# Patient Record
Sex: FEMALE | Race: WHITE | Employment: STUDENT | URBAN - NONMETROPOLITAN AREA
[De-identification: names, ages, dates, MRNs, and addresses within clinical notes are randomized per-mention and may not be internally consistent; named-entity substitution may affect disease eponyms.]

---
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
## 2018-07-30 ENCOUNTER — OFFICE VISIT (OUTPATIENT)
Dept: PRIMARY CARE CLINIC | Age: 18
End: 2018-07-30
Payer: COMMERCIAL

## 2018-07-30 VITALS
OXYGEN SATURATION: 99 % | WEIGHT: 199 LBS | HEIGHT: 65 IN | DIASTOLIC BLOOD PRESSURE: 64 MMHG | HEART RATE: 68 BPM | RESPIRATION RATE: 16 BRPM | TEMPERATURE: 97.8 F | BODY MASS INDEX: 33.15 KG/M2 | SYSTOLIC BLOOD PRESSURE: 110 MMHG

## 2018-07-30 DIAGNOSIS — G43.019 INTRACTABLE MIGRAINE WITHOUT AURA AND WITHOUT STATUS MIGRAINOSUS: ICD-10-CM

## 2018-07-30 DIAGNOSIS — Z00.00 WELL ADULT ON ROUTINE HEALTH CHECK: Primary | ICD-10-CM

## 2018-07-30 DIAGNOSIS — Z23 NEED FOR HEPATITIS A VACCINATION: ICD-10-CM

## 2018-07-30 PROCEDURE — 99395 PREV VISIT EST AGE 18-39: CPT | Performed by: NURSE PRACTITIONER

## 2018-07-30 ASSESSMENT — PATIENT HEALTH QUESTIONNAIRE - PHQ9
1. LITTLE INTEREST OR PLEASURE IN DOING THINGS: 0
SUM OF ALL RESPONSES TO PHQ QUESTIONS 1-9: 0
2. FEELING DOWN, DEPRESSED OR HOPELESS: 0
SUM OF ALL RESPONSES TO PHQ9 QUESTIONS 1 & 2: 0

## 2018-07-30 ASSESSMENT — ENCOUNTER SYMPTOMS
RESPIRATORY NEGATIVE: 1
GASTROINTESTINAL NEGATIVE: 1
EYES NEGATIVE: 1

## 2018-07-31 PROCEDURE — 90471 IMMUNIZATION ADMIN: CPT | Performed by: NURSE PRACTITIONER

## 2018-07-31 PROCEDURE — 90632 HEPA VACCINE ADULT IM: CPT | Performed by: NURSE PRACTITIONER

## 2019-02-01 ENCOUNTER — NURSE ONLY (OUTPATIENT)
Dept: PRIMARY CARE CLINIC | Age: 19
End: 2019-02-01
Payer: COMMERCIAL

## 2019-02-01 DIAGNOSIS — Z23 NEED FOR HEPATITIS A VACCINATION: Primary | ICD-10-CM

## 2019-02-01 PROCEDURE — 90471 IMMUNIZATION ADMIN: CPT | Performed by: FAMILY MEDICINE

## 2019-02-01 PROCEDURE — 90632 HEPA VACCINE ADULT IM: CPT | Performed by: FAMILY MEDICINE

## 2020-05-21 ENCOUNTER — OFFICE VISIT (OUTPATIENT)
Dept: PRIMARY CARE CLINIC | Age: 20
End: 2020-05-21
Payer: COMMERCIAL

## 2020-05-21 VITALS
OXYGEN SATURATION: 98 % | DIASTOLIC BLOOD PRESSURE: 76 MMHG | RESPIRATION RATE: 18 BRPM | WEIGHT: 206.2 LBS | HEART RATE: 102 BPM | TEMPERATURE: 98.8 F | HEIGHT: 65 IN | SYSTOLIC BLOOD PRESSURE: 118 MMHG | BODY MASS INDEX: 34.35 KG/M2

## 2020-05-21 PROCEDURE — 99395 PREV VISIT EST AGE 18-39: CPT | Performed by: NURSE PRACTITIONER

## 2020-05-21 RX ORDER — NORTRIPTYLINE HYDROCHLORIDE 10 MG/1
CAPSULE ORAL
COMMUNITY
Start: 2020-01-08

## 2020-05-21 ASSESSMENT — PATIENT HEALTH QUESTIONNAIRE - PHQ9
2. FEELING DOWN, DEPRESSED OR HOPELESS: 0
1. LITTLE INTEREST OR PLEASURE IN DOING THINGS: 0
SUM OF ALL RESPONSES TO PHQ QUESTIONS 1-9: 0
SUM OF ALL RESPONSES TO PHQ9 QUESTIONS 1 & 2: 0
SUM OF ALL RESPONSES TO PHQ QUESTIONS 1-9: 0

## 2020-05-21 ASSESSMENT — ENCOUNTER SYMPTOMS
EYES NEGATIVE: 1
GASTROINTESTINAL NEGATIVE: 1
RESPIRATORY NEGATIVE: 1

## 2020-05-21 NOTE — PROGRESS NOTES
Outpatient Medications   Medication Sig Dispense Refill    nortriptyline (PAMELOR) 10 MG capsule 5 caps qhs for 14 days then 4 caps qhs for 14 days then 3 caps qhs for 14 days then 2 caps qhs for 14 days then 1 cap qhs for 14 days      promethazine (PHENERGAN) 25 MG tablet Take 25 mg by mouth every 6 hours as needed for Nausea      naproxen (NAPROSYN) 375 MG tablet        No current facility-administered medications for this visit. Allergies   Allergen Reactions    Penicillins Rash       Health Maintenance   Topic Date Due    HPV vaccine (1 - 2-dose series) 09/08/2009    Chlamydia screen  09/08/2014    Cervical cancer screen  09/08/2019    DTaP/Tdap/Td vaccine (7 - Td) 09/21/2019    HIV screen  07/11/2021 (Originally 9/8/2013)    Flu vaccine (Season Ended) 09/01/2020    Hepatitis B vaccine  Completed    Hib vaccine  Completed    Varicella vaccine  Completed    Meningococcal (ACWY) vaccine  Completed    Hepatitis A vaccine  Aged Out    Pneumococcal 0-64 years Vaccine  Aged Out       Subjective:      Review of Systems   Constitutional: Negative. HENT: Negative. Eyes: Negative. Respiratory: Negative. Cardiovascular: Negative. Gastrointestinal: Negative. Genitourinary: Negative. Musculoskeletal: Negative. Neurological: Negative. Hematological: Negative. Psychiatric/Behavioral: Negative. Objective:     Physical Exam  Vitals signs and nursing note reviewed. Constitutional:       Appearance: She is well-developed. HENT:      Head: Normocephalic. Right Ear: Tympanic membrane and external ear normal.      Left Ear: Tympanic membrane and external ear normal.      Nose: Nose normal.   Eyes:      Pupils: Pupils are equal, round, and reactive to light. Neck:      Musculoskeletal: Normal range of motion. Cardiovascular:      Rate and Rhythm: Normal rate and regular rhythm. Heart sounds: Normal heart sounds.    Pulmonary:      Effort: Pulmonary effort is

## 2021-06-16 ENCOUNTER — OFFICE VISIT (OUTPATIENT)
Dept: PRIMARY CARE CLINIC | Age: 21
End: 2021-06-16
Payer: COMMERCIAL

## 2021-06-16 VITALS
OXYGEN SATURATION: 98 % | RESPIRATION RATE: 16 BRPM | HEART RATE: 98 BPM | BODY MASS INDEX: 36.19 KG/M2 | SYSTOLIC BLOOD PRESSURE: 110 MMHG | DIASTOLIC BLOOD PRESSURE: 70 MMHG | WEIGHT: 212 LBS | HEIGHT: 64 IN | TEMPERATURE: 97.6 F

## 2021-06-16 DIAGNOSIS — R21 RASH AND NONSPECIFIC SKIN ERUPTION: ICD-10-CM

## 2021-06-16 DIAGNOSIS — Z13.1 SCREENING FOR DIABETES MELLITUS: ICD-10-CM

## 2021-06-16 DIAGNOSIS — Z13.220 ENCOUNTER FOR SCREENING FOR LIPID DISORDER: ICD-10-CM

## 2021-06-16 DIAGNOSIS — Z00.00 WELL ADULT EXAM: Primary | ICD-10-CM

## 2021-06-16 PROCEDURE — 99395 PREV VISIT EST AGE 18-39: CPT | Performed by: NURSE PRACTITIONER

## 2021-06-16 RX ORDER — METHYLPREDNISOLONE 4 MG/1
TABLET ORAL
Qty: 1 KIT | Refills: 0 | Status: SHIPPED | OUTPATIENT
Start: 2021-06-16 | End: 2021-06-22

## 2021-06-16 RX ORDER — FLUCONAZOLE 150 MG/1
TABLET ORAL
Qty: 2 TABLET | Refills: 0 | Status: SHIPPED | OUTPATIENT
Start: 2021-06-16

## 2021-06-16 ASSESSMENT — PATIENT HEALTH QUESTIONNAIRE - PHQ9
SUM OF ALL RESPONSES TO PHQ QUESTIONS 1-9: 0
2. FEELING DOWN, DEPRESSED OR HOPELESS: 0
SUM OF ALL RESPONSES TO PHQ QUESTIONS 1-9: 0
SUM OF ALL RESPONSES TO PHQ9 QUESTIONS 1 & 2: 0
SUM OF ALL RESPONSES TO PHQ QUESTIONS 1-9: 0
1. LITTLE INTEREST OR PLEASURE IN DOING THINGS: 0

## 2021-06-16 ASSESSMENT — ENCOUNTER SYMPTOMS
GASTROINTESTINAL NEGATIVE: 1
RESPIRATORY NEGATIVE: 1
EYES NEGATIVE: 1

## 2021-06-16 NOTE — PROGRESS NOTES
[facility] PHYSICIAN SERVICES  [facility]  [address]  [address] [address]  Dept: [phone]  Dept Fax: [phone]  Loc: [phone]    Susana Ballesteros is a 23 y.o. female who presents today for her medical conditions/complaints as noted below. Susana Ballesteros is c/o of Annual Exam        HPI:     HPI   Chief Complaint   Patient presents with    Annual Exam   she takes nortipyline for migraine headaches. Periods are normal once a  Month. Is not been sexually active. She has never had a Pap smear. She is not prepared for one today. She is not fasting today. Past Medical History:   Diagnosis Date    Bowel dysfunction       History reviewed. No pertinent surgical history.     Vitals 6/16/2021 5/21/2020 7/30/2018 7/12/2017 6/12/2017 3/95/3374   SYSTOLIC 774 [phone]   DIASTOLIC 70 76 64 78 84 92   Site - - - Left Arm Left Arm Right Arm   Position - - - Sitting Sitting Sitting   Cuff Size - - - Medium Adult Medium Adult Medium Adult   Pulse 98 102 68 76 - 101   Temp 97.6 98.8 97.8 97.4 - 97.1   Resp 16 18 16 16 - 18   SpO2 98 98 99 - - -   Weight 212 lb 206 lb 3.2 oz 199 lb 198 lb 8 oz - 201 lb 3.2 oz   Height 5' 4\" 5' 5\" 5' 5\" 5' 5\" - 5' 5\"   Body mass index 36.39 kg/m2 34.31 kg/m2 33.11 kg/m2 33.03 kg/m2 - 33.48 kg/m2   Some recent data might be hidden       Family History   Problem Relation Age of Onset    High Blood Pressure Mother     Diabetes Father     Cancer Maternal Grandfather         pancreatic    Heart Disease Other         Paternal Family Hx     Hypertension Other         Maternal Family Hx/Paternal Family Hx    Cancer Other         Maternal Family Hx    Osteoporosis Other         Maternal Family Hx       Social History     Tobacco Use    Smoking status: Never Smoker    Smokeless tobacco: Never Used   Substance Use Topics    Alcohol use: No      Current Outpatient Medications on File Prior to Visit   Medication Sig Dispense Refill    nortriptyline (PAMELOR) 10 MG capsule 5 caps qhs for 14 days then 4 caps qhs for 14 days then 3 caps qhs for 14 days then 2 caps qhs for 14 days then 1 cap qhs for 14 days      promethazine (PHENERGAN) 25 MG tablet Take 25 mg by mouth every 6 hours as needed for Nausea      naproxen (NAPROSYN) 375 MG tablet        No current facility-administered medications on file prior to visit. Allergies   Allergen Reactions    Penicillins Rash       Health Maintenance   Topic Date Due    Hepatitis C screen  Never done    HPV vaccine (1 - 2-dose series) Never done    COVID-19 Vaccine (1) Never done    Chlamydia screen  Never done    Cervical cancer screen  Never done    DTaP/Tdap/Td vaccine (7 - Td or Tdap) 09/21/2019    HIV screen  07/11/2021 (Originally 9/8/2013)    Flu vaccine (Season Ended) 09/01/2021    Hepatitis B vaccine  Completed    Hib vaccine  Completed    Varicella vaccine  Completed    Meningococcal (ACWY) vaccine  Completed    Hepatitis A vaccine  Aged Out    Pneumococcal 0-64 years Vaccine  Aged Out       Subjective:      Review of Systems   Constitutional: Negative. HENT: Negative. Eyes: Negative. Respiratory: Negative. Cardiovascular: Negative. Gastrointestinal: Negative. Genitourinary: Negative. Musculoskeletal: Negative. Skin: Positive for rash. Neurological: Negative. Psychiatric/Behavioral: Negative. Objective:     Physical Exam  Vitals and nursing note reviewed. Constitutional:       Appearance: Normal appearance. She is well-developed. HENT:      Head: Normocephalic. Right Ear: Tympanic membrane and external ear normal.      Left Ear: Tympanic membrane and external ear normal.      Nose: Nose normal.   Eyes:      Pupils: Pupils are equal, round, and reactive to light. Cardiovascular:      Rate and Rhythm: Normal rate and regular rhythm. Pulses: Normal pulses. Heart sounds: Normal heart sounds.    Pulmonary:      Effort: Pulmonary effort is normal. Breath sounds: Normal breath sounds. Abdominal:      General: Abdomen is flat. Bowel sounds are normal.   Genitourinary:     Comments: Patient declined a breast and  exam today. Musculoskeletal:         General: Normal range of motion. Cervical back: Normal range of motion. Skin:     General: Skin is warm and dry. Capillary Refill: Capillary refill takes less than 2 seconds. Neurological:      General: No focal deficit present. Mental Status: She is alert and oriented to person, place, and time. Psychiatric:         Mood and Affect: Mood normal.         Behavior: Behavior normal.         Thought Content: Thought content normal.         Judgment: Judgment normal.       /70   Pulse 98   Temp 97.6 °F (36.4 °C) (Temporal)   Resp 16   Ht 5' 4\" (1.626 m)   Wt 212 lb (96.2 kg)   SpO2 98%   Breastfeeding No   BMI 36.39 kg/m²     Assessment:       Diagnosis Orders   1. Well adult exam     2. Encounter for screening for lipid disorder  Lipid Panel   3. Screening for diabetes mellitus  Comprehensive Metabolic Panel   4. Rash and nonspecific skin eruption           Plan:          Patient given educational materials -see patient instructions. Discussed use, benefit, and side effects of prescribed medications. All patient questions answered. Pt voiced understanding. Reviewed health maintenance. Instructed to continue currentmedications, diet and exercise. Patient agreed with treatment plan. Follow up as directed. MEDICATIONS:  Orders Placed This Encounter   Medications    fluconazole (DIFLUCAN) 150 MG tablet     Sig: Take one now and repeat in 1 wk if needed     Dispense:  2 tablet     Refill:  0    methylPREDNISolone (MEDROL DOSEPACK) 4 MG tablet     Sig: Take by mouth.      Dispense:  1 kit     Refill:  0         ORDERS:  Orders Placed This Encounter   Procedures    Comprehensive Metabolic Panel    Lipid Panel       Follow-up:  Return in about 1 year (around 6/16/2022). PATIENT INSTRUCTIONS:  Patient Instructions   May use the diflucan to cover for yeast on skin and repeat in a week  Avoid getting too hot this week  Start steroid pack for rash , if not improving dermatology  May return here for fasting labs any time  Discuss pap smear next year or if any female problems. Electronically signed by BONIFACIO Corado CNP on 6/16/2021 at 11:09 AM    EMR Dragon/transcription disclaimer:  Much of thisencounter note is electronic transcription/translation of spoken language to printed texts. The electronic translation of spoken language may be erroneous, or at times, nonsensical words or phrases may be inadvertentlytranscribed.   Although I have reviewed the note for such errors, some may still exist.

## 2021-06-16 NOTE — PATIENT INSTRUCTIONS
May use the diflucan to cover for yeast on skin and repeat in a week  Avoid getting too hot this week  Start steroid pack for rash , if not improving dermatology  May return here for fasting labs any time  Discuss pap smear next year or if any female problems.

## 2023-08-21 NOTE — PROGRESS NOTES
[facility] PHYSICIAN SERVICES  [facility]  [address]  [address]  Dept: [phone]  Dept Fax: [phone]  Loc: [phone]    Vazquez Breen is a 21 y.o. female who presents today for her medical conditions/complaints as noted below. Vazquez Breen is c/o of Annual Exam (patient presents today for her annual physical. Patient has not been fasting. )        HPI:     HPI   Chief Complaint   Patient presents with    Annual Exam     patient presents today for her annual physical. Patient has not been fasting. periods are once a month, heavy but only last a few days. She is going to [employer] entering her third year. She is traveling a lot with [address] trips. She is working at a . She has not had her hepatitis A injections. She still sees a specialist in [place] for her headaches    Past Medical History:   Diagnosis Date    Bowel dysfunction       History reviewed. No pertinent surgical history.     Vitals 7/30/2018 7/12/2017 6/12/2017 6/12/2017 7/11/2016 8/2/1525   SYSTOLIC [phone] [phone]   DIASTOLIC 64 78 84 92 72 74   Site - Left Arm Left Arm Right Arm Left Arm -   Position - Sitting Sitting Sitting Sitting -   Cuff Size - Medium Adult Medium Adult Medium Adult Medium Adult -   Pulse 68 76 - 101 80 69   Temp 97.8 97.4 - 97.1 98.3 97.2   Resp 16 16 - 18 20 18   Weight 199 lb 198 lb 8 oz - 201 lb 3.2 oz 191 lb 188 lb   Height 5' 5\" 5' 5\" - 5' 5\" 5' 5\" 5' 4\"   BMI (wt*703/ht~2) 33.11 kg/m2 33.03 kg/m2 - 33.48 kg/m2 31.78 kg/m2 32.27 kg/m2   Some recent data might be hidden       Family History   Problem Relation Age of Onset    High Blood Pressure Mother     Diabetes Father     Heart Disease Other         Paternal Family Hx     Hypertension Other         Maternal Family Hx/Paternal Family Hx    Cancer Other         Maternal Family Hx    Osteoporosis Other         Maternal Family Hx       Social History   Substance Use Topics    Smoking status: Never Smoker    Smokeless tobacco: Never Used    Alcohol use No      Current Outpatient Prescriptions   Medication Sig Dispense Refill    promethazine (PHENERGAN) 25 MG tablet Take 25 mg by mouth every 6 hours as needed for Nausea      naproxen (NAPROSYN) 375 MG tablet        No current facility-administered medications for this visit. Allergies   Allergen Reactions    Penicillins Rash       Health Maintenance   Topic Date Due    Chlamydia screen  09/08/2014    HIV screen  07/11/2021 (Originally 9/8/2013)    Flu vaccine (1) 09/01/2018    DTaP/Tdap/Td vaccine (7 - Td) 09/21/2019    Meningococcal (MCV) Vaccine Age 0-22 Years  Completed       Subjective:      Review of Systems   Constitutional: Negative. HENT: Negative. Eyes: Negative. Respiratory: Negative. Cardiovascular: Negative. Gastrointestinal: Negative. Endocrine: Negative. Genitourinary: Negative. Musculoskeletal: Negative. Skin: Negative. Psychiatric/Behavioral: Negative. Objective:     Physical Exam   Constitutional: She is oriented to person, place, and time. She appears well-developed and well-nourished. HENT:   Head: Normocephalic. Right Ear: External ear normal.   Left Ear: External ear normal.   Mouth/Throat: Oropharynx is clear and moist.   Eyes: Pupils are equal, round, and reactive to light. Neck: Normal range of motion. Cardiovascular: Normal rate, regular rhythm, normal heart sounds and intact distal pulses. Pulmonary/Chest: Effort normal and breath sounds normal.   Abdominal: Soft. Bowel sounds are normal.   Genitourinary:   Genitourinary Comments: Declined  exam   Musculoskeletal: Normal range of motion. Neurological: She is alert and oriented to person, place, and time. Skin: Skin is warm and dry. Psychiatric: She has a normal mood and affect. Her behavior is normal. Judgment and thought content normal.   Nursing note and vitals reviewed.     /64   Pulse 68   Temp 97.8 PAST MEDICAL HISTORY:  Acute appendicitis with perforation and localized peritonitis